# Patient Record
Sex: FEMALE | URBAN - METROPOLITAN AREA
[De-identification: names, ages, dates, MRNs, and addresses within clinical notes are randomized per-mention and may not be internally consistent; named-entity substitution may affect disease eponyms.]

---

## 2021-05-30 ENCOUNTER — OFFICE VISIT (OUTPATIENT)
Dept: URGENT CARE | Facility: CLINIC | Age: 31
End: 2021-05-30
Payer: COMMERCIAL

## 2021-05-30 VITALS
HEART RATE: 80 BPM | DIASTOLIC BLOOD PRESSURE: 50 MMHG | SYSTOLIC BLOOD PRESSURE: 100 MMHG | WEIGHT: 152 LBS | TEMPERATURE: 97.7 F | HEIGHT: 63 IN | RESPIRATION RATE: 18 BRPM | BODY MASS INDEX: 26.93 KG/M2 | OXYGEN SATURATION: 100 %

## 2021-05-30 DIAGNOSIS — M54.50 CHRONIC MIDLINE LOW BACK PAIN WITHOUT SCIATICA: Primary | ICD-10-CM

## 2021-05-30 DIAGNOSIS — G89.29 CHRONIC MIDLINE LOW BACK PAIN WITHOUT SCIATICA: Primary | ICD-10-CM

## 2021-05-30 DIAGNOSIS — K21.9 GASTROESOPHAGEAL REFLUX DISEASE, UNSPECIFIED WHETHER ESOPHAGITIS PRESENT: ICD-10-CM

## 2021-05-30 PROCEDURE — 99202 OFFICE O/P NEW SF 15 MIN: CPT | Performed by: PHYSICIAN ASSISTANT

## 2021-05-30 RX ORDER — LIDOCAINE 50 MG/G
1 PATCH TOPICAL DAILY
Qty: 6 PATCH | Refills: 0 | Status: SHIPPED | OUTPATIENT
Start: 2021-05-30

## 2021-05-30 RX ORDER — CYCLOBENZAPRINE HCL 10 MG
TABLET ORAL 3 TIMES DAILY PRN
COMMUNITY
Start: 2021-05-11

## 2021-05-30 RX ORDER — CLONAZEPAM 0.5 MG/1
TABLET ORAL 2 TIMES DAILY PRN
COMMUNITY
Start: 2021-05-26

## 2021-05-30 RX ORDER — KETOROLAC TROMETHAMINE 30 MG/ML
30 INJECTION, SOLUTION INTRAMUSCULAR; INTRAVENOUS ONCE
Status: COMPLETED | OUTPATIENT
Start: 2021-05-30 | End: 2021-05-30

## 2021-05-30 RX ORDER — ESCITALOPRAM OXALATE 10 MG/1
10 TABLET ORAL EVERY MORNING
COMMUNITY
Start: 2021-05-26

## 2021-05-30 RX ORDER — HYDROCODONE BITARTRATE AND ACETAMINOPHEN 10; 325 MG/1; MG/1
TABLET ORAL 2 TIMES DAILY PRN
COMMUNITY
Start: 2021-05-11

## 2021-05-30 RX ORDER — CIPROFLOXACIN 500 MG/1
TABLET, FILM COATED ORAL 2 TIMES DAILY
COMMUNITY
Start: 2021-05-24

## 2021-05-30 RX ORDER — ARIPIPRAZOLE 2 MG/1
4 TABLET ORAL
COMMUNITY
Start: 2021-05-26

## 2021-05-30 RX ORDER — PANTOPRAZOLE SODIUM 40 MG/1
TABLET, DELAYED RELEASE ORAL DAILY
COMMUNITY
Start: 2021-05-13

## 2021-05-30 RX ORDER — PANTOPRAZOLE SODIUM 40 MG/1
40 TABLET, DELAYED RELEASE ORAL DAILY
Qty: 7 TABLET | Refills: 0 | Status: SHIPPED | OUTPATIENT
Start: 2021-05-30

## 2021-05-30 RX ADMIN — KETOROLAC TROMETHAMINE 30 MG: 30 INJECTION, SOLUTION INTRAMUSCULAR; INTRAVENOUS at 13:00

## 2021-05-30 NOTE — PATIENT INSTRUCTIONS
Chronic low back pain sees pain management in [place]  toradol IM given in office x 1 dose  Refill x 1 week of protonix sent via EMR  Advised patient due to unknown medical hx cannot refill hydrocodone for 1 week  rx lidocaine patches sent via EMR    Follow up with PCP in 3-5 days  Proceed to  ER if symptoms worsen

## 2021-05-30 NOTE — PROGRESS NOTES
[facility]        NAME: Chris Subramanian is a 27 y o  female  : 1990    MRN: [id number]  DATE: 2021  TIME: 11:05 PM    Assessment and Plan   Chronic midline low back pain without sciatica [M54 5, G89 29]  1  Chronic midline low back pain without sciatica  ketorolac (TORADOL) injection 30 mg    lidocaine (LIDODERM) 5 %   2  Gastroesophageal reflux disease, unspecified whether esophagitis present  pantoprazole (PROTONIX) 40 mg tablet         Patient Instructions   Chronic low back pain sees pain management in [place]  toradol IM given in office x 1 dose  Refill x 1 week of protonix sent via EMR  Advised patient due to unknown medical hx cannot refill hydrocodone for 1 week  rx lidocaine patches sent via EMR    Follow up with PCP in 3-5 days  Proceed to  ER if symptoms worsen  Chief Complaint     Chief Complaint   Patient presents with    Medication Refill     Requesting medication refill - drove from [place] 21 for family emergency  and plans on returning 21  States she had no time to obtain refills on Protonix 40 mg, Flexeril and Norco for chronic back and GERD diseaese  History of Present Illness       Karen  Is a 18-year-old female who presents to clinic complaining of chronic back pain  She is visiting from [place] and needs refills on her medications  She sees a pain specialist and has opioid pain medication agreement with them however this was an emergency visit and she did not get enough refills before she left  She is needs a refill of flexor, Protonix and hydrocodone  She states the chronic back pain stems from herniated discs in her lower lumbar back  She has a history of a gastric bypass surgery and therefore cannot take any NSAIDs  She notes moderate pain at rest and severe pain with bending or standing for long periods of time  She denies any other chronic medical conditions        Review of Systems   Review of Systems   Constitutional: Negative for chills and fever  Musculoskeletal: Positive for back pain  Current Medications       Current Outpatient Medications:     ARIPiprazole (ABILIFY) 2 mg tablet, Take 4 mg by mouth daily at bedtime, Disp: , Rfl:     ciprofloxacin (CIPRO) 500 mg tablet, 2 (two) times a day, Disp: , Rfl:     clonazePAM (KlonoPIN) 0 5 mg tablet, 2 (two) times a day as needed, Disp: , Rfl:     cyclobenzaprine (FLEXERIL) 10 mg tablet, 3 (three) times a day as needed, Disp: , Rfl:     escitalopram (LEXAPRO) 10 mg tablet, Take 10 mg by mouth every morning, Disp: , Rfl:     HYDROcodone-acetaminophen (NORCO)  mg per tablet, 2 (two) times a day as needed, Disp: , Rfl:     lidocaine (LIDODERM) 5 %, Apply 1 patch topically daily Remove & Discard patch within 12 hours or as directed by MD, Disp: 6 patch, Rfl: 0    pantoprazole (PROTONIX) 40 mg tablet, daily, Disp: , Rfl:     pantoprazole (PROTONIX) 40 mg tablet, Take 1 tablet (40 mg total) by mouth daily, Disp: 7 tablet, Rfl: 0    Current Allergies     Allergies as of 05/30/2021 - Reviewed 05/30/2021   Allergen Reaction Noted    Cefuroxime Hives 08/28/2011    Tramadol GI Intolerance 07/15/2016            The following portions of the patient's history were reviewed and updated as appropriate: allergies, current medications, past family history, past medical history, past social history, past surgical history and problem list      Past Medical History:   Diagnosis Date    Anxiety     Depression     Disc disorder of lumbar region     GERD (gastroesophageal reflux disease)     Urinary tract infection        Past Surgical History:   Procedure Laterality Date    MYRINGOTOMY W/ TUBES      SLEEVE GASTROPLASTY      TONSILLECTOMY         History reviewed  No pertinent family history  Medications have been verified          Objective   /50   Pulse 80   Temp 97 7 °F (36 5 °C)   Resp 18   Ht 5' 3" (1 6 m)   Wt 68 9 kg (152 lb)   LMP 05/29/2021 (Exact Date) SpO2 100%   BMI 26 93 kg/m²   Patient's last menstrual period was 05/29/2021 (exact date)  Physical Exam     Physical Exam  Vitals and nursing note reviewed  Constitutional:       General: She is not in acute distress  Appearance: Normal appearance  She is not ill-appearing  Cardiovascular:      Rate and Rhythm: Normal rate and regular rhythm  Heart sounds: Normal heart sounds  Pulmonary:      Effort: Pulmonary effort is normal       Breath sounds: Normal breath sounds  Neurological:      Mental Status: She is alert and oriented to person, place, and time     Psychiatric:         Mood and Affect: Mood normal          Behavior: Behavior normal

## 2022-10-19 ENCOUNTER — TELEPHONE (OUTPATIENT)
Dept: PSYCHIATRY | Facility: CLINIC | Age: 32
End: 2022-10-19

## 2022-10-19 NOTE — TELEPHONE ENCOUNTER
NP  Pt called to request med mgmt services  Writer informed her that there are no openings at this moment  Pt agreed to be added to the wait list       Pt provided her provisional address in [place]   Preferences:      Writer added pt to the proper wait list

## 2022-10-21 ENCOUNTER — OFFICE VISIT (OUTPATIENT)
Dept: FAMILY MEDICINE CLINIC | Facility: CLINIC | Age: 32
End: 2022-10-21
Payer: COMMERCIAL

## 2022-10-21 VITALS
RESPIRATION RATE: 16 BRPM | BODY MASS INDEX: 27.66 KG/M2 | OXYGEN SATURATION: 99 % | SYSTOLIC BLOOD PRESSURE: 104 MMHG | TEMPERATURE: 98.3 F | HEIGHT: 64 IN | WEIGHT: 162 LBS | HEART RATE: 94 BPM | DIASTOLIC BLOOD PRESSURE: 78 MMHG

## 2022-10-21 DIAGNOSIS — F41.1 GAD (GENERALIZED ANXIETY DISORDER): ICD-10-CM

## 2022-10-21 DIAGNOSIS — Z13.89 SCREENING FOR CARDIOVASCULAR, RESPIRATORY, AND GENITOURINARY DISEASES: ICD-10-CM

## 2022-10-21 DIAGNOSIS — Z00.00 WELL ADULT EXAM: Primary | ICD-10-CM

## 2022-10-21 DIAGNOSIS — Z23 NEED FOR VACCINATION: ICD-10-CM

## 2022-10-21 DIAGNOSIS — Z13.83 SCREENING FOR CARDIOVASCULAR, RESPIRATORY, AND GENITOURINARY DISEASES: ICD-10-CM

## 2022-10-21 DIAGNOSIS — Z12.4 SCREENING FOR CERVICAL CANCER: ICD-10-CM

## 2022-10-21 DIAGNOSIS — F19.14 OTH PSYCHOACTIVE SUBSTANCE ABUSE W MOOD DISORDER (HCC): ICD-10-CM

## 2022-10-21 DIAGNOSIS — Z13.6 SCREENING FOR CARDIOVASCULAR, RESPIRATORY, AND GENITOURINARY DISEASES: ICD-10-CM

## 2022-10-21 DIAGNOSIS — Z11.59 NEED FOR HEPATITIS C SCREENING TEST: ICD-10-CM

## 2022-10-21 DIAGNOSIS — Z13.29 SCREENING FOR THYROID DISORDER: ICD-10-CM

## 2022-10-21 DIAGNOSIS — F33.2 SEVERE EPISODE OF RECURRENT MAJOR DEPRESSIVE DISORDER, WITHOUT PSYCHOTIC FEATURES (HCC): ICD-10-CM

## 2022-10-21 DIAGNOSIS — F60.3 BORDERLINE PERSONALITY DISORDER (HCC): ICD-10-CM

## 2022-10-21 DIAGNOSIS — Z98.84 HISTORY OF GASTRIC BYPASS: ICD-10-CM

## 2022-10-21 DIAGNOSIS — Z11.4 SCREENING FOR HIV (HUMAN IMMUNODEFICIENCY VIRUS): ICD-10-CM

## 2022-10-21 PROBLEM — F31.9 BIPOLAR DISORDER (HCC): Status: RESOLVED | Noted: 2022-10-21 | Resolved: 2022-10-21

## 2022-10-21 PROBLEM — F31.9 BIPOLAR DISORDER (HCC): Status: ACTIVE | Noted: 2022-10-21

## 2022-10-21 PROCEDURE — 99385 PREV VISIT NEW AGE 18-39: CPT | Performed by: FAMILY MEDICINE

## 2022-10-21 RX ORDER — QUETIAPINE FUMARATE 100 MG/1
100 TABLET, FILM COATED ORAL
COMMUNITY
Start: 2022-10-13 | End: 2022-10-21 | Stop reason: SDUPTHER

## 2022-10-21 RX ORDER — MULTIVIT-MIN/IRON/FOLIC ACID/K 18-600-40
2 CAPSULE ORAL DAILY
COMMUNITY
Start: 2022-10-13 | End: 2022-10-21 | Stop reason: SDUPTHER

## 2022-10-21 RX ORDER — QUETIAPINE FUMARATE 25 MG/1
25 TABLET, FILM COATED ORAL 3 TIMES DAILY
Qty: 90 TABLET | Refills: 2 | Status: SHIPPED | OUTPATIENT
Start: 2022-10-21 | End: 2023-01-19

## 2022-10-21 RX ORDER — NALTREXONE HYDROCHLORIDE 50 MG/1
50 TABLET, FILM COATED ORAL
COMMUNITY
Start: 2022-10-13 | End: 2022-10-21 | Stop reason: SDUPTHER

## 2022-10-21 RX ORDER — TOPIRAMATE 50 MG/1
50 TABLET, FILM COATED ORAL 2 TIMES DAILY
Qty: 180 TABLET | Refills: 0 | Status: SHIPPED | OUTPATIENT
Start: 2022-10-21 | End: 2023-01-19

## 2022-10-21 RX ORDER — BUPROPION HYDROCHLORIDE 150 MG/1
150 TABLET, EXTENDED RELEASE ORAL DAILY
Qty: 90 TABLET | Refills: 0 | Status: SHIPPED | OUTPATIENT
Start: 2022-10-21

## 2022-10-21 RX ORDER — TOPIRAMATE 50 MG/1
50 TABLET, FILM COATED ORAL 2 TIMES DAILY
COMMUNITY
Start: 2022-10-13 | End: 2022-10-21 | Stop reason: SDUPTHER

## 2022-10-21 RX ORDER — DIPHENOXYLATE HYDROCHLORIDE AND ATROPINE SULFATE 2.5; .025 MG/1; MG/1
1 TABLET ORAL DAILY
COMMUNITY

## 2022-10-21 RX ORDER — PROPRANOLOL HYDROCHLORIDE 10 MG/1
10 TABLET ORAL 3 TIMES DAILY
Qty: 90 TABLET | Refills: 2 | Status: SHIPPED | OUTPATIENT
Start: 2022-10-21 | End: 2023-01-19

## 2022-10-21 RX ORDER — HYDROXYZINE HYDROCHLORIDE 25 MG/1
25 TABLET, FILM COATED ORAL EVERY 6 HOURS PRN
Qty: 30 TABLET | Refills: 0 | Status: SHIPPED | OUTPATIENT
Start: 2022-10-21

## 2022-10-21 RX ORDER — MULTIVIT-MIN/IRON/FOLIC ACID/K 18-600-40
2 CAPSULE ORAL DAILY
Qty: 90 TABLET | Refills: 0 | Status: SHIPPED | OUTPATIENT
Start: 2022-10-21

## 2022-10-21 RX ORDER — QUETIAPINE FUMARATE 100 MG/1
100 TABLET, FILM COATED ORAL
Qty: 90 TABLET | Refills: 0 | Status: SHIPPED | OUTPATIENT
Start: 2022-10-21

## 2022-10-21 RX ORDER — ARIPIPRAZOLE 5 MG/1
5 TABLET ORAL 2 TIMES DAILY
COMMUNITY
Start: 2022-10-13 | End: 2022-10-21 | Stop reason: SDUPTHER

## 2022-10-21 RX ORDER — CHOLECALCIFEROL (VITAMIN D3) 125 MCG
500 CAPSULE ORAL DAILY
Qty: 90 TABLET | Refills: 0 | Status: SHIPPED | OUTPATIENT
Start: 2022-10-21

## 2022-10-21 RX ORDER — QUETIAPINE FUMARATE 25 MG/1
TABLET, FILM COATED ORAL
COMMUNITY
Start: 2022-10-18 | End: 2022-10-21 | Stop reason: SDUPTHER

## 2022-10-21 RX ORDER — ARIPIPRAZOLE 5 MG/1
5 TABLET ORAL 2 TIMES DAILY
Qty: 180 TABLET | Refills: 0 | Status: SHIPPED | OUTPATIENT
Start: 2022-10-21 | End: 2023-01-19

## 2022-10-21 RX ORDER — BUPROPION HYDROCHLORIDE 150 MG/1
TABLET, EXTENDED RELEASE ORAL
COMMUNITY
Start: 2022-10-13 | End: 2022-10-21 | Stop reason: SDUPTHER

## 2022-10-21 RX ORDER — CHOLECALCIFEROL (VITAMIN D3) 125 MCG
500 CAPSULE ORAL DAILY
COMMUNITY
Start: 2022-10-13 | End: 2022-10-21 | Stop reason: SDUPTHER

## 2022-10-21 RX ORDER — PROPRANOLOL HYDROCHLORIDE 10 MG/1
TABLET ORAL
COMMUNITY
Start: 2022-10-13 | End: 2022-10-21 | Stop reason: SDUPTHER

## 2022-10-21 RX ORDER — NALOXONE HYDROCHLORIDE 4 MG/.1ML
SPRAY NASAL
COMMUNITY
Start: 2022-09-30

## 2022-10-21 RX ORDER — NALTREXONE HYDROCHLORIDE 50 MG/1
50 TABLET, FILM COATED ORAL
Qty: 90 TABLET | Refills: 0 | Status: SHIPPED | OUTPATIENT
Start: 2022-10-21

## 2022-10-21 NOTE — PROGRESS NOTES
FAMILY PRACTICE HEALTH MAINTENANCE OFFICE VISIT  [facility]    NAME: Samuel Barry  AGE: 34 y o  SEX: female  : 1990     DATE: 10/21/2022    Assessment and Plan     1  Well adult exam    2  Need for hepatitis C screening test  -     Hepatitis C Antibody ([facility]); Future    3  Screening for HIV (human immunodeficiency virus)  -     [facility]- Human Immunodeficiency Virus 1/2 Antigen / Antibody ( Fourth Generation) with Reflex Testing; Future    4  Screening for cardiovascular, respiratory, and genitourinary diseases  -     CBC and differential; Future  -     Comprehensive metabolic panel; Future  -     Lipid Panel with Direct LDL reflex; Future    5  Screening for thyroid disorder  -     TSH, 3rd generation with Free T4 reflex; Future    6  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 [facility])  Assessment & Plan:  She was recently admitted at [facility] in [place], [place] for methamphetamine addiction  She was discharged on 10/12/22  She goes to [facility] in [place], [place] 5 days/week and follows an addiction specialist outpatient  Last time she had meth was 33 days ago  She is on waitlist to get in with psychiatry at [place] [address] and needs temporary refills of her medications  Orders:  -     naltrexone (REVIA) 50 mg tablet; Take 1 tablet (50 mg total) by mouth daily at bedtime    7  Borderline personality disorder [facility])  Assessment & Plan:  She was recently admitted at [facility] in [place], [place] for methamphetamine addiction  She was discharged on 10/12/22  [email or web address] [place], [place] 5 days/week and follows an addiction specialist outpatient  Last time she had meth was 33 days ago  She is on waitlist to get in with psychiatry at [place] [address] and needs temporary refills of her medications         8  MICHAEL (generalized anxiety disorder)  Assessment & Plan:  She was recently admitted at [facility] in [place], [place] for methamphetamine addiction  She was discharged on 10/12/22  She goes to [facility] in [place], [place] 5 days/week and follows an addiction specialist outpatient  Last time she had meth was 33 days ago  She is on waitlist to get in with psychiatry at [place] [address] and needs temporary refills of her medications  Orders:  -     hydrOXYzine HCL (ATARAX) 25 mg tablet; Take 1 tablet (25 mg total) by mouth every 6 (six) hours as needed for anxiety  -     buPROPion (WELLBUTRIN SR) 150 mg 12 hr tablet; Take 1 tablet (150 mg total) by mouth in the morning  -     propranolol (INDERAL) 10 mg tablet; Take 1 tablet (10 mg total) by mouth 3 (three) times a day  -     QUEtiapine (SEROquel) 100 mg tablet; Take 1 tablet (100 mg total) by mouth daily at bedtime  -     QUEtiapine (SEROquel) 25 mg tablet; Take 1 tablet (25 mg total) by mouth 3 (three) times a day  -     topiramate (TOPAMAX) 50 MG tablet; Take 1 tablet (50 mg total) by mouth 2 (two) times a day 8AM and 8PM    9  Severe episode of recurrent major depressive disorder, without psychotic features [facility])  Assessment & Plan:  She was recently admitted at [facility] in [place], [place] for methamphetamine addiction  She was discharged on 10/12/22  She goes to [facility] in [place], [place] 5 days/week and follows an addiction specialist outpatient  Last time she had meth was 33 days ago  She is on waitlist to get in with psychiatry at [place] [address] and needs temporary refills of her medications  Orders:  -     ARIPiprazole (ABILIFY) 5 mg tablet; Take 1 tablet (5 mg total) by mouth 2 (two) times a day 8AM and 8PM  -     buPROPion (WELLBUTRIN SR) 150 mg 12 hr tablet; Take 1 tablet (150 mg total) by mouth in the morning  -     QUEtiapine (SEROquel) 100 mg tablet; Take 1 tablet (100 mg total) by mouth daily at bedtime  -     QUEtiapine (SEROquel) 25 mg tablet;  Take 1 tablet (25 mg total) by mouth 3 (three) times a day  -     topiramate (TOPAMAX) 50 MG tablet; Take 1 tablet (50 mg total) by mouth 2 (two) times a day 8AM and 8PM    10  History of gastric bypass  -     vitamin B-12 (VITAMIN B-12) 500 mcg tablet; Take 1 tablet (500 mcg total) by mouth daily  -     Vitamin D, Cholecalciferol, 25 MCG (1000 UT) TABS; Take 2 tablets (2,000 Units total) by mouth daily    11  Need for vaccination    12  Screening for cervical cancer  -     IGP, Aptima HPV        Patient Counseling:   Nutrition: Stressed importance of a well balanced diet, moderation of sodium/saturated fat, caloric balance and sufficient intake of fiber  Exercise: Stressed the importance of regular exercise with a goal of 150 minutes per week  Dental Health: Discussed daily flossing and brushing and regular dental visits   Immunizations reviewed: Declined recommended vaccinations  Discussed benefits of:  Cervical Cancer screening and Screening labs  BMI Counseling: Body mass index is 28 03 kg/m²  Discussed with patient's BMI with her  The BMI is above normal  Nutrition recommendations include reducing portion sizes, decreasing overall calorie intake, 3-5 servings of fruits/vegetables daily, reducing fast food intake and consuming healthier snacks  Exercise recommendations include moderate aerobic physical activity for 150 minutes/week  No follow-ups on file          Chief Complaint     Chief Complaint   Patient presents with   • Establish Care     Mz cma   • Physical Exam       History of Present Illness     HPI    Well Adult Physical   Patient here for a comprehensive physical exam       Diet and Physical Activity  Diet: well balanced diet  Exercise: frequently      Depression Screen  PHQ-2/9 Depression Screening    Little interest or pleasure in doing things: 0 - not at all  Feeling down, depressed, or hopeless: 0 - not at all  PHQ-2 Score: 0  PHQ-2 Interpretation: Negative depression screen          General Health  Hearing: Normal:  bilateral  Vision: no vision problems  Dental: no dental visits for >1 year, brushes teeth twice daily and flosses teeth occasionally    Reproductive Health  No issues  and Regular Periods      The following portions of the patient's history were reviewed and updated as appropriate: allergies, current medications, past family history, past medical history, past social history, past surgical history and problem list     Review of Systems     Review of Systems   Constitutional: Negative  HENT: Negative  Eyes: Negative  Respiratory: Negative  Cardiovascular: Negative  Gastrointestinal: Negative  Endocrine: Negative  Genitourinary: Negative  Musculoskeletal: Negative  Skin: Negative  Allergic/Immunologic: Negative  Neurological: Negative  Hematological: Negative  Psychiatric/Behavioral: Negative          Past Medical History     Past Medical History:   Diagnosis Date   • Anxiety    • Depression    • Disc disorder of lumbar region    • GERD (gastroesophageal reflux disease)    • Urinary tract infection        Past Surgical History     Past Surgical History:   Procedure Laterality Date   • MYRINGOTOMY W/ TUBES     • SLEEVE GASTROPLASTY     • TONSILLECTOMY         Social History     Social History     Socioeconomic History   • Marital status: Unknown     Spouse name: None   • Number of children: None   • Years of education: None   • Highest education level: None   Occupational History   • None   Tobacco Use   • Smoking status: Current Some Day Smoker     Packs/day: 1 00     Types: Cigarettes   • Smokeless tobacco: Never Used   • Tobacco comment: 1-2 per month   Vaping Use   • Vaping Use: Every day   • Substances: Nicotine, Flavoring   Substance and Sexual Activity   • Alcohol use: Never   • Drug use: Never   • Sexual activity: None   Other Topics Concern   • None   Social History Narrative   • None     Social Determinants of Health     Financial Resource Strain: Not on file   Food Insecurity: Not on file   Transportation Needs: Not on file   Physical Activity: Not on file   Stress: Not on file   Social Connections: Not on file   Intimate Partner Violence: Not on file   Housing Stability: Not on file       Family History     History reviewed  No pertinent family history      Current Medications       Current Outpatient Medications:   •  ARIPiprazole (ABILIFY) 5 mg tablet, Take 1 tablet (5 mg total) by mouth 2 (two) times a day 8AM and 8PM, Disp: 180 tablet, Rfl: 0  •  buPROPion (WELLBUTRIN SR) 150 mg 12 hr tablet, Take 1 tablet (150 mg total) by mouth in the morning, Disp: 90 tablet, Rfl: 0  •  hydrOXYzine HCL (ATARAX) 25 mg tablet, Take 1 tablet (25 mg total) by mouth every 6 (six) hours as needed for anxiety, Disp: 30 tablet, Rfl: 0  •  multivitamin (THERAGRAN) TABS, Take 1 tablet by mouth daily, Disp: , Rfl:   •  naloxone (NARCAN) 4 mg/0 1 mL nasal spray, , Disp: , Rfl:   •  naltrexone (REVIA) 50 mg tablet, Take 1 tablet (50 mg total) by mouth daily at bedtime, Disp: 90 tablet, Rfl: 0  •  propranolol (INDERAL) 10 mg tablet, Take 1 tablet (10 mg total) by mouth 3 (three) times a day, Disp: 90 tablet, Rfl: 2  •  QUEtiapine (SEROquel) 100 mg tablet, Take 1 tablet (100 mg total) by mouth daily at bedtime, Disp: 90 tablet, Rfl: 0  •  QUEtiapine (SEROquel) 25 mg tablet, Take 1 tablet (25 mg total) by mouth 3 (three) times a day, Disp: 90 tablet, Rfl: 2  •  topiramate (TOPAMAX) 50 MG tablet, Take 1 tablet (50 mg total) by mouth 2 (two) times a day 8AM and 8PM, Disp: 180 tablet, Rfl: 0  •  vitamin B-12 (VITAMIN B-12) 500 mcg tablet, Take 1 tablet (500 mcg total) by mouth daily, Disp: 90 tablet, Rfl: 0  •  Vitamin D, Cholecalciferol, 25 MCG (1000 UT) TABS, Take 2 tablets (2,000 Units total) by mouth daily, Disp: 90 tablet, Rfl: 0     Allergies     Allergies   Allergen Reactions   • Cefuroxime Hives   • Tramadol GI Intolerance     Very sick, nausea and vomiting       Objective     /78   Pulse 94   Temp 98 3 °F (36 8 °C)   Resp 16   Ht 5' 3 75" (1 619 m)   Wt 73 5 kg (162 lb)   SpO2 99%   BMI 28 03 kg/m²      Physical Exam  Constitutional:       General: She is not in acute distress  Appearance: Normal appearance  She is well-developed  She is not diaphoretic  HENT:      Head: Normocephalic and atraumatic  Right Ear: Tympanic membrane, ear canal and external ear normal  There is no impacted cerumen  Left Ear: Tympanic membrane, ear canal and external ear normal  There is no impacted cerumen  Eyes:      General: No scleral icterus  Right eye: No discharge  Left eye: No discharge  Extraocular Movements: Extraocular movements intact  Conjunctiva/sclera: Conjunctivae normal       Pupils: Pupils are equal, round, and reactive to light  Cardiovascular:      Rate and Rhythm: Normal rate and regular rhythm  Heart sounds: Normal heart sounds  No murmur heard  No friction rub  No gallop  Pulmonary:      Effort: Pulmonary effort is normal  No respiratory distress  Breath sounds: Normal breath sounds  No wheezing or rales  Chest:      Chest wall: No tenderness  Abdominal:      General: Bowel sounds are normal  There is no distension  Palpations: Abdomen is soft  There is no mass  Tenderness: There is no abdominal tenderness  There is no guarding or rebound  Musculoskeletal:         General: No deformity  Normal range of motion  Cervical back: Normal range of motion and neck supple  Skin:     General: Skin is warm and dry  Findings: No erythema or rash  Neurological:      Mental Status: She is alert and oriented to person, place, and time  Psychiatric:         Behavior: Behavior normal          Thought Content:  Thought content normal          Judgment: Judgment normal             Visual Acuity Screening    Right eye Left eye Both eyes   Without correction: 20/40 20/20 20/15   With correction:              [id number]

## 2022-10-21 NOTE — ASSESSMENT & PLAN NOTE
She was recently admitted at [facility] in [place], [place] for methamphetamine addiction  She was discharged on 10/12/22  She goes to [facility] in [place], [place] 5 days/week and follows an addiction specialist outpatient  Last time she had meth was 33 days ago  She is on waitlist to get in with psychiatry at [place] [address] and needs temporary refills of her medications

## 2022-10-21 NOTE — ASSESSMENT & PLAN NOTE
She was recently admitted at [facility] in [place], [place] for methamphetamine addiction  She was discharged on 10/12/22  She goes to [facility] in [address] 5 days/week and follows an addiction specialist outpatient  Last time she had meth was 33 days ago  She is on waitlist to get in with psychiatry at [place] [address] and needs temporary refills of her medications

## 2022-10-21 NOTE — ASSESSMENT & PLAN NOTE
She was recently admitted at [facility] in [place], [place] for methamphetamine addiction  She was discharged on 10/12/22  She goes to [facility] in [place], [place] 5 days/week and follows an addiction specialist outpatient  Last time she had meth was 33 days ago  She is on waitlist to get in with psychiatry at [facility] and needs temporary refills of her medications

## 2022-10-21 NOTE — ASSESSMENT & PLAN NOTE
She was recently admitted at [facility] in [place], [place] for methamphetamine addiction  She was discharged on 10/12/22  She goes to [facility] in [place], [place] 5 days/week and follows an addiction specialist outpatient  Last time she had meth was 33 days ago  She is on waitlist to get in with psychiatry at [facility] and needs temporary refills of her medications

## 2022-10-26 LAB
CYTOLOGIST CVX/VAG CYTO: ABNORMAL
DX ICD CODE: ABNORMAL
DX ICD CODE: ABNORMAL
HPV I/H RISK 4 DNA CVX QL PROBE+SIG AMP: POSITIVE
OTHER STN SPEC: ABNORMAL
PATH REPORT.FINAL DX SPEC: ABNORMAL
PATHOLOGIST CVX/VAG CYTO: ABNORMAL
RECOM F/U CVX/VAG CYTO: ABNORMAL
SL AMB NOTE:: ABNORMAL
SL AMB SPECIMEN ADEQUACY: ABNORMAL
SL AMB TEST METHODOLOGY: ABNORMAL

## 2022-10-27 ENCOUNTER — TELEPHONE (OUTPATIENT)
Dept: FAMILY MEDICINE CLINIC | Facility: CLINIC | Age: 32
End: 2022-10-27

## 2022-10-27 NOTE — TELEPHONE ENCOUNTER
Tried to call patient to review pap smear  The only number on file for her went to the voicemail box of someone named "Amanda Méndez"  Unsure if we have the correct phone number for Karen

## 2022-12-08 ENCOUNTER — OFFICE VISIT (OUTPATIENT)
Dept: FAMILY MEDICINE CLINIC | Facility: CLINIC | Age: 32
End: 2022-12-08

## 2022-12-08 VITALS
HEIGHT: 64 IN | SYSTOLIC BLOOD PRESSURE: 110 MMHG | OXYGEN SATURATION: 99 % | BODY MASS INDEX: 27.14 KG/M2 | RESPIRATION RATE: 18 BRPM | WEIGHT: 159 LBS | TEMPERATURE: 98.2 F | DIASTOLIC BLOOD PRESSURE: 70 MMHG | HEART RATE: 77 BPM

## 2022-12-08 DIAGNOSIS — N89.8 VAGINAL DISCHARGE: Primary | ICD-10-CM

## 2022-12-08 DIAGNOSIS — Z98.84 HISTORY OF GASTRIC BYPASS: ICD-10-CM

## 2022-12-08 DIAGNOSIS — R63.5 WEIGHT GAIN: ICD-10-CM

## 2022-12-08 DIAGNOSIS — R87.610 ASCUS WITH POSITIVE HIGH RISK HPV CERVICAL: ICD-10-CM

## 2022-12-08 DIAGNOSIS — R87.810 ASCUS WITH POSITIVE HIGH RISK HPV CERVICAL: ICD-10-CM

## 2022-12-08 DIAGNOSIS — M54.50 ACUTE MIDLINE LOW BACK PAIN WITHOUT SCIATICA: ICD-10-CM

## 2022-12-08 LAB
SL AMB  POCT GLUCOSE, UA: ABNORMAL
SL AMB LEUKOCYTE ESTERASE,UA: ABNORMAL
SL AMB POCT BILIRUBIN,UA: ABNORMAL
SL AMB POCT BLOOD,UA: ABNORMAL
SL AMB POCT CLARITY,UA: ABNORMAL
SL AMB POCT COLOR,UA: ABNORMAL
SL AMB POCT KETONES,UA: 15
SL AMB POCT NITRITE,UA: ABNORMAL
SL AMB POCT PH,UA: 6
SL AMB POCT SPECIFIC GRAVITY,UA: >=1.03
SL AMB POCT URINE PROTEIN: ABNORMAL
SL AMB POCT UROBILINOGEN: 0.2

## 2022-12-08 RX ORDER — DOXYCYCLINE HYCLATE 100 MG/1
100 CAPSULE ORAL EVERY 12 HOURS SCHEDULED
Qty: 14 CAPSULE | Refills: 0 | Status: SHIPPED | OUTPATIENT
Start: 2022-12-08 | End: 2022-12-15

## 2022-12-08 RX ORDER — MULTIVIT-MIN/IRON/FOLIC ACID/K 18-600-40
CAPSULE ORAL
Qty: 90 TABLET | Refills: 0 | Status: SHIPPED | OUTPATIENT
Start: 2022-12-08

## 2022-12-08 RX ORDER — FLUCONAZOLE 150 MG/1
150 TABLET ORAL ONCE
Qty: 1 TABLET | Refills: 0 | Status: SHIPPED | OUTPATIENT
Start: 2022-12-08 | End: 2022-12-08

## 2022-12-08 NOTE — PROGRESS NOTES
Assessment/Plan:    1  Vaginal discharge  -     VAGINOSIS DNA PROBE (AFFIRM)  -     Chlamydia/GC amplified DNA by PCR  -     Genital Comprehensive Culture  -     doxycycline hyclate (VIBRAMYCIN) 100 mg capsule; Take 1 capsule (100 mg total) by mouth every 12 (twelve) hours for 7 days  -     fluconazole (DIFLUCAN) 150 mg tablet; Take 1 tablet (150 mg total) by mouth once for 1 dose    2  Weight gain  -     Ambulatory referral to Weight Management; Future    3  Acute midline low back pain without sciatica  Comments:  will continue with ibuprofen with food as needed for pain  Orders:  -     POCT urine dip auto non-scope    4  ASCUS with positive high risk HPV cervical  Comments:  schedule to follow with gynecologist on this next month        Patient Instructions: Take medication with food  It is important that you take the entire course of antibiotics prescribed  May also take a probiotic of your choice to maintain healthy GI oanh  Can take some probiotic and yogurt with the medication  Supportive care discussed and advised  Advised to RTO for any worsening and no improvement  Follow up for no improvement and worsening of conditions  Patient advised and educated when to see immediate medical care  Return if symptoms worsen or fail to improve  No future appointments  Subjective:      Patient ID: Darnell Styles is a 34 y o  female  Chief Complaint   Patient presents with   • Vaginal Discharge   • Fatigue     rmklpn   • Back Pain         Vitals:  /70   Pulse 77   Temp 98 2 °F (36 8 °C)   Resp 18   Ht 5' 3 74" (1 619 m)   Wt 72 1 kg (159 lb)   LMP 11/25/2022 (Exact Date)   SpO2 99%   BMI 27 52 kg/m²   Wt Readings from Last 3 Encounters:   12/08/22 72 1 kg (159 lb)   10/21/22 73 5 kg (162 lb)   05/30/21 68 9 kg (152 lb)      HPI  Patient stated that having lot of vaginal discharge from a week and also having lower back pain and feeling fatigue and weak  Denies any URI/GI? UTI symptoms  Stated that concerned about STD as had new partner couple of months ago  Also wants to follow with weight management  The following portions of the patient's history were reviewed and updated as appropriate: allergies, current medications, past family history, past medical history, past social history, past surgical history and problem list       Review of Systems   Constitutional: Positive for fatigue  Negative for chills, diaphoresis, fever and unexpected weight change  Respiratory: Negative  Cardiovascular: Negative  Gastrointestinal: Negative for abdominal pain, nausea and vomiting  Genitourinary: Positive for vaginal discharge  Negative for decreased urine volume, difficulty urinating, dysuria, flank pain, frequency, genital sores, hematuria and urgency  Musculoskeletal: Positive for back pain  Skin: Negative  Neurological: Positive for weakness  Negative for dizziness and headaches  Objective:    Social History     Tobacco Use   Smoking Status Some Days   • Packs/day: 0 50   • Types: Cigarettes   • Start date: 2005   Smokeless Tobacco Never   Tobacco Comments    1-2 per month       Allergies:    Allergies   Allergen Reactions   • Cefuroxime Hives   • Tramadol GI Intolerance     Very sick, nausea and vomiting         Current Outpatient Medications   Medication Sig Dispense Refill   • ARIPiprazole (ABILIFY) 5 mg tablet Take 1 tablet (5 mg total) by mouth 2 (two) times a day 8AM and 8PM 180 tablet 0   • buPROPion (WELLBUTRIN SR) 150 mg 12 hr tablet Take 1 tablet (150 mg total) by mouth in the morning 90 tablet 0   • doxycycline hyclate (VIBRAMYCIN) 100 mg capsule Take 1 capsule (100 mg total) by mouth every 12 (twelve) hours for 7 days 14 capsule 0   • fluconazole (DIFLUCAN) 150 mg tablet Take 1 tablet (150 mg total) by mouth once for 1 dose 1 tablet 0   • hydrOXYzine HCL (ATARAX) 25 mg tablet Take 1 tablet (25 mg total) by mouth every 6 (six) hours as needed for anxiety 30 tablet 0   • multivitamin (THERAGRAN) TABS Take 1 tablet by mouth daily     • naloxone (NARCAN) 4 mg/0 1 mL nasal spray      • naltrexone (REVIA) 50 mg tablet Take 1 tablet (50 mg total) by mouth daily at bedtime 90 tablet 0   • propranolol (INDERAL) 10 mg tablet Take 1 tablet (10 mg total) by mouth 3 (three) times a day 90 tablet 2   • QUEtiapine (SEROquel) 100 mg tablet Take 1 tablet (100 mg total) by mouth daily at bedtime 90 tablet 0   • QUEtiapine (SEROquel) 25 mg tablet Take 1 tablet (25 mg total) by mouth 3 (three) times a day 90 tablet 2   • topiramate (TOPAMAX) 50 MG tablet Take 1 tablet (50 mg total) by mouth 2 (two) times a day 8AM and 8PM 180 tablet 0   • vitamin B-12 (VITAMIN B-12) 500 mcg tablet Take 1 tablet (500 mcg total) by mouth daily 90 tablet 0   • Vitamin D, Cholecalciferol, 25 MCG (1000 UT) TABS Take 2 tablets (2,000 Units total) by mouth daily 90 tablet 0     No current facility-administered medications for this visit  Physical Exam  Exam conducted with a chaperone present (Brielle Moreland LPN)  Constitutional:       Appearance: Normal appearance  HENT:      Head: Normocephalic and atraumatic  Nose: Nose normal    Eyes:      Conjunctiva/sclera: Conjunctivae normal    Cardiovascular:      Rate and Rhythm: Normal rate and regular rhythm  Pulses: Normal pulses  Heart sounds: Normal heart sounds  Pulmonary:      Effort: Pulmonary effort is normal       Breath sounds: Normal breath sounds  Genitourinary:     Labia:         Right: No rash, tenderness, lesion or injury  Left: No rash, tenderness, lesion or injury  Vagina: No signs of injury and foreign body  Vaginal discharge present  No erythema, tenderness, bleeding, lesions or prolapsed vaginal walls  Musculoskeletal:         General: Tenderness (tender on palpation on lumbar spine area) present  Skin:     General: Skin is warm and dry  Findings: No rash     Neurological:      Mental Status: She is alert and oriented to person, place, and time  Psychiatric:         Mood and Affect: Mood normal          Behavior: Behavior normal          Thought Content:  Thought content normal          Judgment: Judgment normal              Recent Results (from the past 24 hour(s))   POCT urine dip auto non-scope    Collection Time: 12/08/22  9:33 AM   Result Value Ref Range     COLOR,UA orange     CLARITY,UA slightly cloudy     SPECIFIC GRAVITY,UA >=1 030      PH,UA 6 0     LEUKOCYTE ESTERASE,UA neg     NITRITE,UA neg     GLUCOSE, UA neg     KETONES,UA 15     BILIRUBIN,UA small     BLOOD,UA neg     POCT URINE PROTEIN trace     SL AMB POCT UROBILINOGEN 0 2              ROSS Briceno

## 2022-12-08 NOTE — PATIENT INSTRUCTIONS
Doxycycline (By mouth)   Doxycycline ([id number]-n-[id number]-klerosalio)  Treats and prevents infections  Also used to prevent malaria and treat rosacea or severe acne  This medicine is a tetracycline antibiotic  Brand Name(s): Acticlate, Adoxa, Adoxa Silverio 1/150, Avidoxy, Doryx, Doryx MPC, LymePak, Mondoxyne NL, Monodox, Morgidox 6O606CD, Morgidox 2L857EA, Oracea, Targadox, Vibramycin Calcium, Vibramycin Hyclate   There may be other brand names for this medicine  When This Medicine Should Not Be Used: This medicine is not right for everyone  Do not use it if you had an allergic reaction to doxycycline or another tetracycline antibiotic, or if you are pregnant or breastfeeding  How to Use This Medicine:   Capsule, Delayed Release Capsule, Long Acting Capsule, Liquid, Tablet, Delayed Release Tablet  Your doctor will tell you how much medicine to use  Do not use more than directed  Ask your pharmacist or doctor if you need to take this medicine with or without food  Some forms can be taken with food or milk, but others must be taken on an empty stomach  Capsule: Swallow whole  Do not break, crush, chew, or open it  Oracea® capsules: This medicine must be taken on an empty stomach, at least 1 hour before or 2 hours after a meal   Acticlate® Cap capsules: You may take this medicine with food or milk to avoid stomach irritation  Delayed-release capsules: You may also take this medicine by sprinkling the open capsules onto cold, soft applesauce  Do not lose any pellets when transferring the contents  Swallow this mixture right away  Do not chew it  Do not store the mixture for later use  You may take this medicine with food or milk to avoid stomach upset  Delayed-release tablets: You may also take this medicine by sprinkling the broken tablets onto room-temperature applesauce  Swallow this mixture right away  Do not chew it  Do not store the mixture for later use   You may take this medicine with food or milk to avoid stomach upset   Oral liquid: Shake the bottle well just before each use  Measure the oral liquid medicine with a marked measuring spoon, oral syringe, or medicine cup  Tablets: You may take this medicine with food or milk to avoid stomach irritation  To break a tablet, hold the tablet between your thumb and index fingers close to the appropriate scored line  Then, apply enough pressure to snap the tablet segments apart  Do not use the tablet if it does not break on the scored lines  Take all of the medicine in your prescription to clear up your infection, even if you feel better after the first few doses  Drink plenty of fluids to avoid throat problems, if you take the capsule or tablet form  Malaria prevention: Start taking the medicine 1 or 2 days before you travel  Take the medicine every day during your trip  Keep taking it for 4 weeks after you return  However, do not use the medicine for longer than 4 months  Do not use this medicine for more than 9 months if you are using it for rosacea  Use only the brand of medicine your doctor prescribed  Other brands may not work the same way  Read and follow the patient instructions that come with this medicine  Talk to your doctor or pharmacist if you have any questions  Missed dose: Take a dose as soon as you remember  If it is almost time for your next dose, wait until then and take a regular dose  Do not take extra medicine to make up for a missed dose  Store the medicine in a closed container at room temperature, away from heat, moisture, and direct light  Do not freeze the oral liquid  Drugs and Foods to Avoid:   Ask your doctor or pharmacist before using any other medicine, including over-the-counter medicines, vitamins, and herbal products  Some medicines can affect how doxycycline works   Tell your doctor if you are using any of the following:  Bismuth subsalicylate  Acne medicines (including isotretinoin)  Birth control pills  Blood thinner (including warfarin)  Medicine for seizures (including carbamazepine, phenobarbital, phenytoin)  Medicine that contains aluminum, calcium, magnesium, or iron (including an antacid or vitamin supplement)  Medicine to treat psoriasis (including acitretin)  Penicillin antibiotic  Stomach medicine  Warnings While Using This Medicine: This medicine may cause birth defects if either partner is using it during conception or pregnancy  Tell your doctor right away if you or your partner becomes pregnant  Birth control pills may not work as well when used together with this medicine  Use a second form of birth control to keep from getting pregnant  Tell your doctor if you have kidney disease, liver disease, asthma, or an allergy to sulfites  Tell your doctor if you had surgery on your stomach, or if you have a history of yeast infections  This medicine may cause the following problems:  Permanent change in tooth color (in children younger than 6years of age)  Serious skin reactions, including drug reaction with eosinophilia and systemic symptoms (DRESS)  Increased pressure inside the head  Yeast infection  Immune system problems  This medicine can cause diarrhea  Call your doctor if the diarrhea becomes severe, does not stop, or is bloody  Do not take any medicine to stop diarrhea until you have talked to your doctor  Diarrhea can occur 2 months or more after you stop taking this medicine  This medicine may make your skin more sensitive to sunlight  Wear sunscreen  Do not use sunlamps or tanning beds  Tell any doctor or dentist who treats you that you are using this medicine  This medicine may affect certain medical test results  Call your doctor if your symptoms do not improve or if they get worse  Your doctor will do lab tests at regular visits to check on the effects of this medicine  Keep all appointments  Keep all medicine out of the reach of children  Never share your medicine with anyone    Possible Side Effects While Using This Medicine:   Call your doctor right away if you notice any of these side effects: Allergic reaction: Itching or hives, swelling in your face or hands, swelling or tingling in your mouth or throat, chest tightness, trouble breathing  Blistering, peeling, red skin rash  Burning, pain, or irritation in your upper stomach or throat  Diarrhea that may contain blood  Fever, chills, cough, runny or stuffy nose, sore throat, body aches  Joint pain, unusual tiredness or weakness  Severe headache, dizziness, vision changes  Sudden and severe stomach pain, nausea, vomiting, lightheadedness  Swollen, painful, or tender lymph glands in the neck, armpit, or groin, or yellow skin or eyes  If you notice these less serious side effects, talk with your doctor:   Darkening of your skin, scars, teeth, or gums  Sores or white patches on your lips, mouth, or throat  If you notice other side effects that you think are caused by this medicine, tell your doctor  Call your doctor for medical advice about side effects  You may report side effects to FDA at 2-580-FDA-2842    © Copyright [employer] 2022 Information is for End User's use only and may not be sold, redistributed or otherwise used for commercial purposes  The above information is an  only  It is not intended as medical advice for individual conditions or treatments  Talk to your doctor, nurse or pharmacist before following any medical regimen to see if it is safe and effective for you

## 2022-12-09 ENCOUNTER — TELEPHONE (OUTPATIENT)
Dept: FAMILY MEDICINE CLINIC | Facility: CLINIC | Age: 32
End: 2022-12-09

## 2022-12-09 NOTE — TELEPHONE ENCOUNTER
Patient calling for results of her vaginal cultures that were swabbed yesterday  Please call her when results are in

## 2022-12-13 LAB
BACTERIA GENITAL AEROBE CULT: NORMAL
C TRACH RRNA SPEC QL NAA+PROBE: NEGATIVE
CANDIDA RRNA VAG QL PROBE: NEGATIVE
G VAGINALIS RRNA GENITAL QL PROBE: NEGATIVE
Lab: NORMAL
N GONORRHOEA RRNA SPEC QL NAA+PROBE: NEGATIVE
T VAGINALIS RRNA GENITAL QL PROBE: NEGATIVE

## 2022-12-13 NOTE — RESULT ENCOUNTER NOTE
Patient informed,  she made a appointment to discuss results further with you    SAIRA Hemphill/LIZBETH

## 2022-12-15 ENCOUNTER — OFFICE VISIT (OUTPATIENT)
Dept: FAMILY MEDICINE CLINIC | Facility: CLINIC | Age: 32
End: 2022-12-15

## 2022-12-15 VITALS
WEIGHT: 160.4 LBS | SYSTOLIC BLOOD PRESSURE: 114 MMHG | TEMPERATURE: 97 F | HEART RATE: 100 BPM | HEIGHT: 64 IN | RESPIRATION RATE: 17 BRPM | BODY MASS INDEX: 27.39 KG/M2 | DIASTOLIC BLOOD PRESSURE: 76 MMHG

## 2022-12-15 DIAGNOSIS — R87.610 ASCUS WITH POSITIVE HIGH RISK HPV CERVICAL: ICD-10-CM

## 2022-12-15 DIAGNOSIS — R87.810 ASCUS WITH POSITIVE HIGH RISK HPV CERVICAL: ICD-10-CM

## 2022-12-15 DIAGNOSIS — N89.8 VAGINAL DISCHARGE: Primary | ICD-10-CM

## 2022-12-15 NOTE — PROGRESS NOTES
Assessment/Plan:    1  Vaginal discharge  Comments:  will be following with gynecologist as current testing has been negative and other symptoms has resolved    2  ASCUS with positive high risk HPV cervical  Comments:  will follow with gynecologist          Patient Instructions:  Supportive care discussed and advised  Advised to RTO for any worsening and no improvement  Follow up for no improvement and worsening of conditions  Patient advised and educated when to see immediate medical care  Return if symptoms worsen or fail to improve  No future appointments  Subjective:      Patient ID: Luanne Fisher is a 34 y o  female  Chief Complaint   Patient presents with   • Follow-up     Review test results nm  lpn         Vitals:  /76   Pulse 100   Temp (!) 97 °F (36 1 °C)   Resp 17   Ht 5' 3 75" (1 619 m)   Wt 72 8 kg (160 lb 6 4 oz)   LMP 11/25/2022 (Exact Date)   BMI 27 75 kg/m²   Wt Readings from Last 3 Encounters:   12/15/22 72 8 kg (160 lb 6 4 oz)   12/08/22 72 1 kg (159 lb)   10/21/22 73 5 kg (162 lb)      HPI  Patient came to discuss her results  Wanted to know what she was tested for  Discussed all genital swabs results in detail  Stated that overall feeling much better but still having vaginal discharge and has ACUS with high risk HPV and will be following with gynecologist      The following portions of the patient's history were reviewed and updated as appropriate: allergies, current medications, past family history, past medical history, past social history, past surgical history and problem list       Review of Systems   Constitutional: Negative for chills, diaphoresis, fatigue, fever and unexpected weight change  Respiratory: Negative  Cardiovascular: Negative  Gastrointestinal: Negative for abdominal pain, nausea and vomiting  Genitourinary: Positive for vaginal discharge   Negative for decreased urine volume, difficulty urinating, dysuria, flank pain, frequency, genital sores, hematuria, urgency, vaginal bleeding and vaginal pain  Musculoskeletal: Negative  Skin: Negative  Neurological: Negative for dizziness and headaches  Objective:    Social History     Tobacco Use   Smoking Status Every Day   • Packs/day: 0 50   • Types: Cigarettes   • Start date: 2005   Smokeless Tobacco Never   Tobacco Comments    1-2 per month       Allergies:    Allergies   Allergen Reactions   • Cefuroxime Hives   • Tramadol GI Intolerance     Very sick, nausea and vomiting         Current Outpatient Medications   Medication Sig Dispense Refill   • ARIPiprazole (ABILIFY) 5 mg tablet Take 1 tablet (5 mg total) by mouth 2 (two) times a day 8AM and 8PM 180 tablet 0   • buPROPion (WELLBUTRIN SR) 150 mg 12 hr tablet Take 1 tablet (150 mg total) by mouth in the morning 90 tablet 0   • hydrOXYzine HCL (ATARAX) 25 mg tablet Take 1 tablet (25 mg total) by mouth every 6 (six) hours as needed for anxiety 30 tablet 0   • multivitamin (THERAGRAN) TABS Take 1 tablet by mouth daily     • naloxone (NARCAN) 4 mg/0 1 mL nasal spray      • naltrexone (REVIA) 50 mg tablet Take 1 tablet (50 mg total) by mouth daily at bedtime 90 tablet 0   • propranolol (INDERAL) 10 mg tablet Take 1 tablet (10 mg total) by mouth 3 (three) times a day 90 tablet 2   • QUEtiapine (SEROquel) 100 mg tablet Take 1 tablet (100 mg total) by mouth daily at bedtime 90 tablet 0   • QUEtiapine (SEROquel) 25 mg tablet Take 1 tablet (25 mg total) by mouth 3 (three) times a day 90 tablet 2   • topiramate (TOPAMAX) 50 MG tablet Take 1 tablet (50 mg total) by mouth 2 (two) times a day 8AM and 8PM 180 tablet 0   • vitamin B-12 (VITAMIN B-12) 500 mcg tablet Take 1 tablet (500 mcg total) by mouth daily 90 tablet 0   • Vitamin D, Cholecalciferol, 25 MCG (1000 UT) TABS Take 2 tablets by mouth once daily 90 tablet 0   • doxycycline hyclate (VIBRAMYCIN) 100 mg capsule Take 1 capsule (100 mg total) by mouth every 12 (twelve) hours for 7 days (Patient not taking: Reported on 12/15/2022) 14 capsule 0     No current facility-administered medications for this visit  Physical Exam  Constitutional:       Appearance: Normal appearance  HENT:      Head: Normocephalic and atraumatic  Nose: Nose normal    Eyes:      Conjunctiva/sclera: Conjunctivae normal    Cardiovascular:      Rate and Rhythm: Normal rate and regular rhythm  Pulses: Normal pulses  Heart sounds: Normal heart sounds  Pulmonary:      Effort: Pulmonary effort is normal       Breath sounds: Normal breath sounds  Skin:     General: Skin is warm and dry  Findings: No rash  Neurological:      Mental Status: She is alert and oriented to person, place, and time  Psychiatric:         Mood and Affect: Mood normal          Behavior: Behavior normal          Thought Content:  Thought content normal          Judgment: Judgment normal                      ROSS Camacho

## 2022-12-16 ENCOUNTER — PROCEDURE VISIT (OUTPATIENT)
Dept: OBGYN CLINIC | Facility: CLINIC | Age: 32
End: 2022-12-16

## 2022-12-16 VITALS — WEIGHT: 162 LBS | DIASTOLIC BLOOD PRESSURE: 60 MMHG | BODY MASS INDEX: 28.03 KG/M2 | SYSTOLIC BLOOD PRESSURE: 110 MMHG

## 2022-12-16 DIAGNOSIS — B96.89 BACTERIAL VAGINOSIS: ICD-10-CM

## 2022-12-16 DIAGNOSIS — N76.0 BACTERIAL VAGINOSIS: ICD-10-CM

## 2022-12-16 DIAGNOSIS — R87.810 ASCUS WITH POSITIVE HIGH RISK HPV CERVICAL: Primary | ICD-10-CM

## 2022-12-16 DIAGNOSIS — R87.610 ASCUS WITH POSITIVE HIGH RISK HPV CERVICAL: Primary | ICD-10-CM

## 2022-12-16 RX ORDER — DOXYCYCLINE HYCLATE 100 MG/1
100 TABLET, DELAYED RELEASE ORAL 2 TIMES DAILY
Qty: 20 TABLET | Refills: 0 | Status: SHIPPED | OUTPATIENT
Start: 2022-12-16 | End: 2022-12-26

## 2022-12-16 NOTE — PROGRESS NOTES
Alysha Rodas is a 34 y o  G5, P4 female here for a problem visit  Patient had Pap smear in October with ASCUS positive high risk HPV with her primary care physician  Patient has a long history of abnormal Pap smears and has had colposcopy previously but no further surgery needed  Last Pap about 4 years ago when she had her child which was also abnormal patient had a new partner about 2 months ago about 21 lifetime partners, coitarche age 14, history of PID, history of HPV, smoking usually about 10 cigarettes/day, positive family history of aunt with HPV as well  Patient also here because she has had 2 weeks of feeling uncomfortable low back pain and pelvic discomfort and significantly increased vaginal discharge  She had negative testing including Aptiva swab GC chlamydia and general genital culture with her primary care physician and was treated with doxycycline for a week  Patient reports no fevers or chills no other changes in exposures has not traveled  Patient is feeling unwell with low back cramping and had felt better on doxycycline but has finished her course  Gynecologic History  Patient's last menstrual period was 2022 (exact date)  Contraception: tubal ligation  Last Pap: 2022   Results were: ASCUS positive high-risk HPV      OB History        5    Para   4    Term   4            AB   1    Living   4       SAB        IAB        Ectopic        Multiple        Live Births   4             Patient reports 2 vaginal, 2 , all term and 1     Past Medical History:   Diagnosis Date   • Anxiety    • Depression    • Disc disorder of lumbar region    • GERD (gastroesophageal reflux disease)    • Urinary tract infection      Past Surgical History:   Procedure Laterality Date   • MYRINGOTOMY W/ TUBES     • SLEEVE GASTROPLASTY     • TONSILLECTOMY     • TUBAL LIGATION       Current Outpatient Medications   Medication Instructions   • ARIPiprazole (ABILIFY) 5 mg, Oral, 2 times daily, 8AM and 8PM   • buPROPion (WELLBUTRIN SR) 150 mg, Oral, Daily   • hydrOXYzine HCL (ATARAX) 25 mg, Oral, Every 6 hours PRN   • multivitamin (THERAGRAN) TABS 1 tablet, Oral, Daily   • naloxone (NARCAN) 4 mg/0 1 mL nasal spray No dose, route, or frequency recorded  • naltrexone (REVIA) 50 mg, Oral, Daily at bedtime   • propranolol (INDERAL) 10 mg, Oral, 3 times daily   • QUEtiapine (SEROQUEL) 100 mg, Oral, Daily at bedtime   • QUEtiapine (SEROQUEL) 25 mg, Oral, 3 times daily   • topiramate (TOPAMAX) 50 mg, Oral, 2 times daily, 8AM and 8PM   • vitamin B-12 (VITAMIN B-12) 500 mcg, Oral, Daily   • Vitamin D, Cholecalciferol, 25 MCG (1000 UT) TABS Take 2 tablets by mouth once daily     Allergies   Allergen Reactions   • Cefuroxime Hives   • Tramadol GI Intolerance     Very sick, nausea and vomiting     Social History     Tobacco Use   • Smoking status: Every Day     Packs/day: 0 50     Types: Cigarettes     Start date: 2005   • Smokeless tobacco: Never   • Tobacco comments:     1-2 per month   Vaping Use   • Vaping Use: Every day   • Substances: Nicotine, Flavoring   Substance Use Topics   • Alcohol use: Never   • Drug use: Never         Review of Systems  Review of Systems   Constitutional: Negative for fatigue, fever and unexpected weight change  HENT: Negative for dental problem, sinus pressure and sinus pain  Eyes: Negative for visual disturbance  Respiratory: Negative for cough, shortness of breath and wheezing  Cardiovascular: Negative for chest pain and leg swelling  Gastrointestinal: Negative for blood in stool, constipation, diarrhea, nausea and vomiting  Endocrine: Negative for cold intolerance, heat intolerance and polydipsia  Genitourinary: Positive for vaginal discharge  Negative for dysuria, frequency, hematuria, menstrual problem and pelvic pain  Musculoskeletal: Negative for arthralgias and back pain     Neurological: Negative for dizziness, seizures and headaches  Psychiatric/Behavioral: The patient is not nervous/anxious  Objective     /60   Wt 73 5 kg (162 lb)   LMP 11/25/2022 (Exact Date)   BMI 28 03 kg/m²   General appearance: alert, fatigued and Mild distress  Pelvic: external genitalia normal, uterus normal size, shape, and consistency, no cervical motion tenderness, cervix normal in appearance, no adnexal masses or tenderness and White discharge in vault, mild general pelvic tenderness  Wet prep positive for bacterial vaginosis no other findings       Colposcopy     Date/Time 12/16/2022 8:47 AM     Universal Protocol   Consent: Verbal consent obtained  Risks and benefits: risks, benefits and alternatives were discussed  Consent given by: patient  Patient understanding: patient states understanding of the procedure being performed  Patient identity confirmed: verbally with patient       Performed by  Sanford Valdez MD     Authorized by Sanford Valdez MD        Indication    ASC-US     Procedure Details   Procedure: Colposcopy w/ endocervical curettage      Under satisfactory analgesia the patient was prepped and draped in the dorsal lithotomy position: yes      [place] speculum was placed in the vagina: yes      Under colposcopic examination the transition zone was seen in entirety: yes      Intracervical block was performed: no      Endocervix was curetted using a Kevorkian curette: yes      Specimen to pathology: yes       Post-procedure      Patient tolerance of procedure: Tolerated well, no immediate complications     Comments       Cervix is large and parous, SCJ at os, no acetowhitening no specific findings other than aforementioned discharge, ECC performed followed by [facility] patient had some cramping otherwise tolerated well         Assessment  34year-old G5, P4 with bacterial vaginosis and abnormal Pap smear    Patient with some general pelvic tenderness encourage patient to go get her lab work today including a CBC she reports that she usually must use [facility]   Advised patient if she is getting worse or not getting better that she should be further evaluated possibly in the emergency room if she develops fevers or chills that she may need further evaluation and treatment or possibly IV antibiotics     Plan  Doxycycline 100 mg p o  twice daily for 10 days, ECC performed with colposcopy return based on results

## 2023-01-13 ENCOUNTER — APPOINTMENT (OUTPATIENT)
Dept: URGENT CARE | Age: 33
End: 2023-01-13

## 2023-02-09 ENCOUNTER — TELEPHONE (OUTPATIENT)
Dept: BARIATRICS | Facility: CLINIC | Age: 33
End: 2023-02-09

## 2023-02-09 NOTE — TELEPHONE ENCOUNTER
Transfer Pt - pt had sleeve in [address] back in 2020 - pt had a plateau in weightloss and started gaining weight back - pt interested in medication to help     Gave Transfer form to [id number] Delgado

## 2023-02-15 ENCOUNTER — TELEPHONE (OUTPATIENT)
Dept: BARIATRICS | Facility: CLINIC | Age: 33
End: 2023-02-15

## 2023-02-15 NOTE — TELEPHONE ENCOUNTER
Transfer pt  I called to schedule her 1 hr with Sushma Jara  Pt stated she was going out of town for 2 weeks and did not feel comfortable scheduling until she returned

## 2023-10-02 ENCOUNTER — TELEPHONE (OUTPATIENT)
Dept: PSYCHIATRY | Facility: CLINIC | Age: 33
End: 2023-10-02

## 2023-10-02 NOTE — LETTER
Dear Nicolasa Reveal : We are contacting you because your name is currently included on the [address] [place] [address] wait-list for Talk Therapy and/or Medication Management. (Please [place] which services are needed)     In our efforts to provide the highest quality care, [facility] has begun the process of upgrading our behavioral health systems to increase efficiency and expedite delivery of services. As part of this process, we ask you to please confirm your continued interest in the services above. If you are no longer interested or in need, please kristopher “No” in the area below. If you are still interested and in need, please kristopher “Yes” and provide your most current demographic and insurance information within 15 days. If we do not receive confirmation from you by 2023 your information will not be included in the system upgrade and your place on the waitlist will be lost.     Thank you in advance for your patience and understanding and we apologize for any inconvenience this may cause. Patient Name and :    Still in need of services: Yes or No     Current Address:     Phone#:     Best time to receive a call: Insurance Carrier:      Policy/ID#: Group#: Insurance Services Phone#:      What is your current presenting problem? Open to virtual talk therapy: Yes or No      We will call you to do an Intake when an appointment becomes available. You can send this information back to us in any of the ways below:    Email: [email or web address]  Fax#:  [phone]  Mail:   [address] [place] [address], [address] [place] [address]